# Patient Record
Sex: MALE | Race: WHITE | NOT HISPANIC OR LATINO | Employment: OTHER | URBAN - METROPOLITAN AREA
[De-identification: names, ages, dates, MRNs, and addresses within clinical notes are randomized per-mention and may not be internally consistent; named-entity substitution may affect disease eponyms.]

---

## 2019-01-28 ENCOUNTER — CONVERSION ENCOUNTER (OUTPATIENT)
Dept: SURGERY | Facility: CLINIC | Age: 79
End: 2019-01-28

## 2019-01-28 ENCOUNTER — PROCEDURE VISIT CONVERTED (OUTPATIENT)
Dept: UROLOGY | Facility: CLINIC | Age: 79
End: 2019-01-28
Attending: UROLOGY

## 2020-03-12 ENCOUNTER — HOSPITAL ENCOUNTER (OUTPATIENT)
Dept: SURGERY | Facility: CLINIC | Age: 80
Discharge: HOME | End: 2020-03-12
Attending: PHYSICIAN ASSISTANT

## 2020-03-14 LAB — BACTERIA UR CULT: NORMAL

## 2020-10-27 ENCOUNTER — HOSPITAL ENCOUNTER (OUTPATIENT)
Dept: SURGERY | Facility: CLINIC | Age: 80
Discharge: HOME | End: 2020-10-27
Attending: UROLOGY

## 2020-10-27 ENCOUNTER — OFFICE VISIT CONVERTED (OUTPATIENT)
Dept: UROLOGY | Facility: CLINIC | Age: 80
End: 2020-10-27
Attending: UROLOGY

## 2021-05-13 NOTE — PROGRESS NOTES
Progress Note      Patient Name: Ty Koroma   Patient ID: [id number]   Sex: Male   YOB: 1940    Primary Care Provider: Keiko Street NP   Referring Provider: Keiko Street NP    Visit Date: October 27, 2020    Provider: Nazario Pearce MD   Location: [facility]   Location Address: [address] [place] [address]  [id number]   Location Phone: [phone]          Chief Complaint  · pt here for urologic issues      History Of Present Illness     80-year-old  gentleman history of low-grade bladder dx 2007    ok stream.  No trouble with initiation of stream. Nocturia X  0.  No urgency or frequency.  No strning.  On finasteride and doxazosin for a few years.    PVR today 87    no gross hematuria     Previous     No history of kidney stone.    Mother with nephrolithiasis    No cardiopulmonary history.  Patient does not smoke.  Patient does not use blood thinner.    Bladder cancer history    Pt never had a recurrence.  Has  made the decision to be followed yearly with urine cytology and UA    3/20  cytologynegative   1/19 cystoscopysure prostatic urethra, minor trabeculations.  Negative  9/15 cytotologynegative  8/12  cystoscopy bilateral retrograde pyelogramsnegative  3/07  TURBTlow-grade urothelial carcinoma, grade 1, papillary    PSA History    9/15 1.45   9/14 1.12    6/10  started finasteride       Past Medical History  Bladder Cancer; BPH; BPH with Urinary Obstruction; Carcinoma In Situ Of Skin; High cholesterol; Personal Hx Bladder Cancer         Past Surgical History  Colonoscopy; Cyst Removal; Cystoscopy; Gallbladder; Hernia; Skin cancer removed.; TURBT         Medication List  doxazosin oral; finasteride 5 mg oral tablet; levothyroxine 50 mcg oral tablet; pravastatin 20 mg oral tablet         Allergy List  Bactrim       Allergies Reconciled  Family Medical History  *Unremarkable         Social History  Tobacco (Former)         Review of  "Systems  · Constitutional  o Denies  o : chills  · Gastrointestinal  o Denies  o : vomiting      Vitals  Date Time BP Position Site L\R Cuff Size HR RR TEMP (F) WT  HT  BMI kg/m2 BSA m2 O2 Sat FR L/min FiO2        10/27/2020 01:21 PM       16  214lbs 16oz 5'  9\" 31.75 2.18             Physical Examination  · Constitutional  o Appearance  o : Well-appearing, well-developed, in no acute distress  · Respiratory  o Respiratory Effort  o : Unlabored breathing  · Neurologic  o Mental Status Examination  o :   § Orientation  § : Grossly oriented to person, place and time, judgment and insight intact, normal mood and affect          Results  · In-Office Procedures  o Surgical procedure  § IOP - Bladder Scan/Residual Urine (30443)   § Specimen vol Ur: 87   o Lab procedure  § Automated Dipstick Urinalysis (Surg Spec) WITHOUT Micro HMH (28355)   § Color Ur: Yellow   § Clarity Ur: Clear   § Glucose Ur Ql Strip: Negative   § Bilirub Ur Ql Strip: Negative   § Ketones Ur Ql Strip: Negative   § Sp Gr Ur Qn: 1.010   § Hgb Ur Ql Strip: Negative   § pH Ur-LsCnc: 5.5   § Prot Ur Ql Strip: Negative   § Urobilinogen Ur Strip-mCnc: 0.2 E.U./dL   § Nitrite Ur Ql Strip: Negative   § WBC Est Ur Ql Strip: Negative       Assessment  · Malignant neoplasm of urinary bladder, unspecified site     188.9/C67.9  · Benign prostatic hyperplasia without lower urinary tract symptoms     600.00/N40.0      Plan  · Medications  o Medications have been Reconciled  o Transition of Care or Provider Policy  · Instructions  o Electronically Identified Patient Education Materials Provided Electronically       BPH    Continue doxazosin and finasteride 5 mg daily.    History of TCC    Patient needs yearly urine cytology and UA.    Cytology today               Electronically Signed by: Dodie Kaur-, -Author on November 9, 2020 02:36:41 PM  Electronically Co-signed by: Nazario Pearce MD -Reviewer on November 9, 2020 03:41:19 PM  "

## 2021-05-14 VITALS — BODY MASS INDEX: 31.84 KG/M2 | WEIGHT: 215 LBS | RESPIRATION RATE: 16 BRPM | HEIGHT: 69 IN

## 2021-05-16 VITALS — WEIGHT: 210 LBS | BODY MASS INDEX: 31.1 KG/M2 | RESPIRATION RATE: 16 BRPM | HEIGHT: 69 IN

## 2021-11-17 DIAGNOSIS — N40.0 BENIGN PROSTATIC HYPERPLASIA, UNSPECIFIED WHETHER LOWER URINARY TRACT SYMPTOMS PRESENT: Primary | ICD-10-CM

## 2021-11-17 RX ORDER — DOXAZOSIN MESYLATE 4 MG/1
TABLET ORAL
COMMUNITY
Start: 2021-09-03 | End: 2022-01-19 | Stop reason: SDUPTHER

## 2021-11-17 RX ORDER — FINASTERIDE 5 MG/1
5 TABLET, FILM COATED ORAL DAILY
Qty: 90 TABLET | Refills: 4 | Status: SHIPPED | OUTPATIENT
Start: 2021-11-17 | End: 2022-01-19 | Stop reason: SDUPTHER

## 2022-01-19 ENCOUNTER — OFFICE VISIT (OUTPATIENT)
Dept: UROLOGY | Facility: CLINIC | Age: 82
End: 2022-01-19

## 2022-01-19 VITALS — BODY MASS INDEX: 33.41 KG/M2 | HEIGHT: 69 IN | RESPIRATION RATE: 12 BRPM | WEIGHT: 225.6 LBS

## 2022-01-19 DIAGNOSIS — N40.0 BENIGN PROSTATIC HYPERPLASIA WITHOUT LOWER URINARY TRACT SYMPTOMS: Primary | ICD-10-CM

## 2022-01-19 DIAGNOSIS — N40.0 BENIGN PROSTATIC HYPERPLASIA, UNSPECIFIED WHETHER LOWER URINARY TRACT SYMPTOMS PRESENT: ICD-10-CM

## 2022-01-19 PROBLEM — E78.00 HIGH CHOLESTEROL: Status: ACTIVE | Noted: 2022-01-19

## 2022-01-19 PROBLEM — C67.9 BLADDER CANCER: Status: ACTIVE | Noted: 2022-01-19

## 2022-01-19 PROBLEM — D04.9 CARCINOMA IN SITU OF SKIN: Status: ACTIVE | Noted: 2022-01-19

## 2022-01-19 LAB
BILIRUB BLD-MCNC: NEGATIVE MG/DL
CLARITY, POC: CLEAR
COLOR UR: YELLOW
EXPIRATION DATE: ABNORMAL
GLUCOSE UR STRIP-MCNC: NEGATIVE MG/DL
KETONES UR QL: NEGATIVE
LEUKOCYTE EST, POC: NEGATIVE
Lab: ABNORMAL
NITRITE UR-MCNC: NEGATIVE MG/ML
PH UR: 5 [PH] (ref 5–8)
PROT UR STRIP-MCNC: NEGATIVE MG/DL
RBC # UR STRIP: ABNORMAL /UL
SP GR UR: 1.02 (ref 1–1.03)
URINE VOLUME: 1
UROBILINOGEN UR QL: NORMAL

## 2022-01-19 PROCEDURE — 99213 OFFICE O/P EST LOW 20 MIN: CPT | Performed by: NURSE PRACTITIONER

## 2022-01-19 PROCEDURE — 51798 US URINE CAPACITY MEASURE: CPT | Performed by: NURSE PRACTITIONER

## 2022-01-19 PROCEDURE — 81003 URINALYSIS AUTO W/O SCOPE: CPT | Performed by: NURSE PRACTITIONER

## 2022-01-19 RX ORDER — CHOLESTYRAMINE 4 G/9G
POWDER, FOR SUSPENSION ORAL
COMMUNITY
Start: 2021-12-03 | End: 2023-01-23

## 2022-01-19 RX ORDER — DOXAZOSIN MESYLATE 4 MG/1
4 TABLET ORAL NIGHTLY
Qty: 90 TABLET | Refills: 3 | Status: SHIPPED | OUTPATIENT
Start: 2022-01-19 | End: 2023-01-23 | Stop reason: SDUPTHER

## 2022-01-19 RX ORDER — FINASTERIDE 5 MG/1
5 TABLET, FILM COATED ORAL DAILY
Qty: 90 TABLET | Refills: 4 | Status: SHIPPED | OUTPATIENT
Start: 2022-01-19 | End: 2022-04-19

## 2022-01-19 RX ORDER — ATORVASTATIN CALCIUM 20 MG/1
TABLET, FILM COATED ORAL
COMMUNITY
Start: 2021-12-04

## 2022-01-19 RX ORDER — PAROXETINE HYDROCHLORIDE 20 MG/1
TABLET, FILM COATED ORAL
COMMUNITY
Start: 2021-11-04

## 2022-01-19 RX ORDER — DONEPEZIL HYDROCHLORIDE 10 MG/1
TABLET, FILM COATED ORAL
COMMUNITY
Start: 2021-12-04

## 2022-01-19 RX ORDER — LEVOTHYROXINE SODIUM 0.07 MG/1
TABLET ORAL
COMMUNITY
Start: 2021-12-15

## 2022-01-19 RX ORDER — TRAZODONE HYDROCHLORIDE 50 MG/1
TABLET ORAL
COMMUNITY
Start: 2021-11-16

## 2022-01-19 RX ORDER — PRAVASTATIN SODIUM 20 MG
TABLET ORAL
COMMUNITY
End: 2023-01-23

## 2022-01-19 NOTE — PROGRESS NOTES
Chief Complaint: Benign Prostatic Hypertrophy (pt here for follow up)    Subjective         History of Present Illness  Ty Koroma is a 81 y.o. male presents to [facility] to be seen for Annual f/u BPH.    history of low-grade bladder dx 2007    ok stream.     No trouble with initiation of stream.     Nocturia X  0.      No urgency or frequency.      No straining.      On finasteride and doxazosin for a few years doing well on these no side effects .    no gross hematuria         Objective     Past Medical History:   Diagnosis Date   • Bladder cancer (HCC)    • BPH (benign prostatic hyperplasia)    • BPH with urinary obstruction 09/23/2015   • Carcinoma in situ of skin    • High cholesterol    • Hx of bladder cancer 10/05/2016    Personal Hx of Bladder Cancer       Past Surgical History:   Procedure Laterality Date   • COLONOSCOPY     • CYST REMOVAL      Groin   • CYSTOSCOPY     • GALLBLADDER SURGERY     • HERNIA REPAIR     • SKIN CANCER EXCISION      Removed from nose   • TRANSURETHRAL RESECTION OF BLADDER TUMOR           Current Outpatient Medications:   •  atorvastatin (LIPITOR) 20 MG tablet, , Disp: , Rfl:   •  cholestyramine (QUESTRAN) 4 g packet, , Disp: , Rfl:   •  donepezil (ARICEPT) 10 MG tablet, , Disp: , Rfl:   •  doxazosin (CARDURA) 4 MG tablet, Take 1 tablet by mouth Every Night., Disp: 90 tablet, Rfl: 3  •  finasteride (PROSCAR) 5 MG tablet, Take 1 tablet by mouth Daily for 90 days., Disp: 90 tablet, Rfl: 4  •  levothyroxine (SYNTHROID, LEVOTHROID) 75 MCG tablet, , Disp: , Rfl:   •  PARoxetine (PAXIL) 20 MG tablet, , Disp: , Rfl:   •  pravastatin (PRAVACHOL) 20 MG tablet, pravastatin 20 mg oral tablet take 1 tablet (20 mg) by oral route once daily   Active, Disp: , Rfl:   •  traZODone (DESYREL) 50 MG tablet, , Disp: , Rfl:     Allergies   Allergen Reactions   • Sulfamethoxazole-Trimethoprim Rash        History reviewed. No pertinent family history.    Social History  "    Socioeconomic History   • Marital status:    Tobacco Use   • Smoking status: Former Smoker   • Smokeless tobacco: Former User   • Tobacco comment: Quit 25 years ago   Vaping Use   • Vaping Use: Never used   Substance and Sexual Activity   • Alcohol use: Defer   • Sexual activity: Defer       Vital Signs:   Resp 12   Ht 175.3 cm (69\")   Wt 102 kg (225 lb 9.6 oz)   BMI 33.32 kg/m²      Physical Exam     Result Review :   The following data was reviewed by: DELILAH Watts on 01/19/2022:  Results for orders placed or performed in visit on 01/19/22   Bladder Scan   Result Value Ref Range    Urine Volume 1    POC Urinalysis Dipstick, Automated    Specimen: Urine   Result Value Ref Range    Color Yellow Yellow, Straw, Dark Yellow, Ayesha    Clarity, UA Clear Clear    Specific Gravity  1.025 1.005 - 1.030    pH, Urine 5.0 5.0 - 8.0    Leukocytes Negative Negative    Nitrite, UA Negative Negative    Protein, POC Negative Negative mg/dL    Glucose, UA Negative Negative, 1000 mg/dL (3+) mg/dL    Ketones, UA Negative Negative    Urobilinogen, UA Normal Normal    Bilirubin Negative Negative    Blood, UA Trace (A) Negative    Lot Number [id number]     Expiration Date 2,022/10         Bladder Scan interpretation 01/19/2022    Estimation of residual urine via BVI 3000 Verathon Bladder Scan  Residual Urine: 1 ml  Indication: Benign prostatic hyperplasia without lower urinary tract symptoms    Benign prostatic hyperplasia, unspecified whether lower urinary tract symptoms present   Position: Supine  Examination: Incremental scanning of the suprapubic area using 2.0 MHz transducer using copious amounts of acoustic gel.   Findings: An anechoic area was demonstrated which represented the bladder, with measurement of residual urine as noted. I inspected this myself. In that the residual urine was insignificant, refer to plan for treatment and plan necessary at this time.            Procedures        Assessment and Plan  "   Diagnoses and all orders for this visit:    1. Benign prostatic hyperplasia without lower urinary tract symptoms (Primary)  -     POC Urinalysis Dipstick, Automated  -     Bladder Scan  -     doxazosin (CARDURA) 4 MG tablet; Take 1 tablet by mouth Every Night.  Dispense: 90 tablet; Refill: 3    2. Benign prostatic hyperplasia, unspecified whether lower urinary tract symptoms present  -     finasteride (PROSCAR) 5 MG tablet; Take 1 tablet by mouth Daily for 90 days.  Dispense: 90 tablet; Refill: 4      Patient doing well at this point in time.  Prescription sent for 1 year.  Patient will call the office if he is with any gross hematuria new or worsening symptoms.      I spent 10 minutes caring for Ty on this date of service. This time includes time spent by me in the following activities:reviewing tests, obtaining and/or reviewing a separately obtained history, performing a medically appropriate examination and/or evaluation , counseling and educating the patient/family/caregiver, ordering medications, tests, or procedures, and documenting information in the medical record  Follow Up   Return in about 1 year (around 1/19/2023) for f/u BP, annual for bladder cancer .  Patient was given instructions and counseling regarding his condition or for health maintenance advice. Please see specific information pulled into the AVS if appropriate.         This document has been electronically signed by DELILAH Watts  January 19, 2022 10:53 EST

## 2023-01-23 ENCOUNTER — OFFICE VISIT (OUTPATIENT)
Dept: UROLOGY | Facility: CLINIC | Age: 83
End: 2023-01-23
Payer: MEDICARE

## 2023-01-23 VITALS — WEIGHT: 231.8 LBS | HEIGHT: 69 IN | RESPIRATION RATE: 14 BRPM | BODY MASS INDEX: 34.33 KG/M2

## 2023-01-23 DIAGNOSIS — N40.1 BPH WITH URINARY OBSTRUCTION: ICD-10-CM

## 2023-01-23 DIAGNOSIS — N13.8 BPH WITH URINARY OBSTRUCTION: ICD-10-CM

## 2023-01-23 DIAGNOSIS — C67.9 MALIGNANT NEOPLASM OF URINARY BLADDER, UNSPECIFIED SITE: Primary | ICD-10-CM

## 2023-01-23 DIAGNOSIS — N40.0 BENIGN PROSTATIC HYPERPLASIA WITHOUT LOWER URINARY TRACT SYMPTOMS: ICD-10-CM

## 2023-01-23 LAB
BILIRUB BLD-MCNC: NEGATIVE MG/DL
CLARITY, POC: CLEAR
COLOR UR: YELLOW
EXPIRATION DATE: ABNORMAL
GLUCOSE UR STRIP-MCNC: NEGATIVE MG/DL
KETONES UR QL: NEGATIVE
LEUKOCYTE EST, POC: NEGATIVE
Lab: ABNORMAL
NITRITE UR-MCNC: NEGATIVE MG/ML
PH UR: 5.5 [PH] (ref 5–8)
PROT UR STRIP-MCNC: NEGATIVE MG/DL
RBC # UR STRIP: ABNORMAL /UL
SP GR UR: 1.01 (ref 1–1.03)
UROBILINOGEN UR QL: NORMAL

## 2023-01-23 PROCEDURE — 99214 OFFICE O/P EST MOD 30 MIN: CPT | Performed by: NURSE PRACTITIONER

## 2023-01-23 PROCEDURE — 81003 URINALYSIS AUTO W/O SCOPE: CPT | Performed by: NURSE PRACTITIONER

## 2023-01-23 PROCEDURE — 81015 MICROSCOPIC EXAM OF URINE: CPT | Performed by: NURSE PRACTITIONER

## 2023-01-23 RX ORDER — FINASTERIDE 5 MG/1
5 TABLET, FILM COATED ORAL DAILY
COMMUNITY
End: 2023-01-23 | Stop reason: SDUPTHER

## 2023-01-23 RX ORDER — OMEPRAZOLE 40 MG/1
40 CAPSULE, DELAYED RELEASE ORAL DAILY
COMMUNITY

## 2023-01-23 RX ORDER — FINASTERIDE 5 MG/1
5 TABLET, FILM COATED ORAL DAILY
Qty: 90 TABLET | Refills: 4 | Status: SHIPPED | OUTPATIENT
Start: 2023-01-23

## 2023-01-23 RX ORDER — LOPERAMIDE HYDROCHLORIDE 2 MG/1
2 CAPSULE ORAL 4 TIMES DAILY PRN
COMMUNITY

## 2023-01-23 RX ORDER — KETOCONAZOLE 20 MG/ML
SHAMPOO TOPICAL
COMMUNITY
Start: 2022-10-26

## 2023-01-23 RX ORDER — LISINOPRIL 5 MG/1
TABLET ORAL
COMMUNITY
Start: 2023-01-05

## 2023-01-23 RX ORDER — DOXAZOSIN MESYLATE 4 MG/1
4 TABLET ORAL NIGHTLY
Qty: 90 TABLET | Refills: 4 | Status: SHIPPED | OUTPATIENT
Start: 2023-01-23 | End: 2023-03-30 | Stop reason: SDUPTHER

## 2023-01-23 RX ORDER — MONTELUKAST SODIUM 4 MG/1
TABLET, CHEWABLE ORAL
COMMUNITY
Start: 2022-12-28

## 2023-01-23 NOTE — PROGRESS NOTES
Chief Complaint: Benign Prostatic Hypertrophy (Pt here for follow up.  Pt says medication is working.)    Subjective         History of Present Illness  Ty Koroma is a 82 y.o. male presents to [facility] to be seen for Annual f/u BPH/ bladder cancer.    history of low-grade bladder dx 2007    ok stream.     States some trouble with initiation of stream.     Nocturia X  1.      No urgency or frequency.      No straining.      On finasteride and doxazosin for a few years doing well on these no side effects.    No significant bother with urinary symptoms.    no gross hematuria         Objective     Past Medical History:   Diagnosis Date   • Bladder cancer (HCC)    • BPH (benign prostatic hyperplasia)    • BPH with urinary obstruction 09/23/2015   • Carcinoma in situ of skin    • High cholesterol    • Hx of bladder cancer 10/05/2016    Personal Hx of Bladder Cancer       Past Surgical History:   Procedure Laterality Date   • COLONOSCOPY     • CYST REMOVAL      Groin   • CYSTOSCOPY     • GALLBLADDER SURGERY     • HERNIA REPAIR     • SKIN CANCER EXCISION      Removed from nose   • TRANSURETHRAL RESECTION OF BLADDER TUMOR           Current Outpatient Medications:   •  atorvastatin (LIPITOR) 20 MG tablet, , Disp: , Rfl:   •  colestipol (COLESTID) 1 g tablet, , Disp: , Rfl:   •  donepezil (ARICEPT) 10 MG tablet, , Disp: , Rfl:   •  doxazosin (CARDURA) 4 MG tablet, Take 1 tablet by mouth Every Night., Disp: 90 tablet, Rfl: 4  •  finasteride (PROSCAR) 5 MG tablet, Take 1 tablet by mouth Daily., Disp: 90 tablet, Rfl: 4  •  ketoconazole (NIZORAL) 2 % shampoo, , Disp: , Rfl:   •  levothyroxine (SYNTHROID, LEVOTHROID) 75 MCG tablet, , Disp: , Rfl:   •  lisinopril (PRINIVIL,ZESTRIL) 5 MG tablet, , Disp: , Rfl:   •  loperamide (IMODIUM) 2 MG capsule, Take 2 mg by mouth 4 (Four) Times a Day As Needed for Diarrhea., Disp: , Rfl:   •  omeprazole (priLOSEC) 40 MG capsule, Take 40 mg by mouth Daily., Disp:  ", Rfl:   •  PARoxetine (PAXIL) 20 MG tablet, , Disp: , Rfl:   •  traZODone (DESYREL) 50 MG tablet, , Disp: , Rfl:     Allergies   Allergen Reactions   • Sulfamethoxazole-Trimethoprim Rash        History reviewed. No pertinent family history.    Social History     Socioeconomic History   • Marital status:    Tobacco Use   • Smoking status: Former   • Smokeless tobacco: Former   • Tobacco comments:     Quit 25 years ago   Vaping Use   • Vaping Use: Never used   Substance and Sexual Activity   • Alcohol use: Defer   • Drug use: Defer   • Sexual activity: Defer       Vital Signs:   Resp 14   Ht 175.3 cm (69\")   Wt 105 kg (231 lb 12.8 oz)   BMI 34.23 kg/m²      Physical Exam     Result Review :   The following data was reviewed by: DELILAH Watts on 01/19/2022:  Results for orders placed or performed in visit on 01/23/23   POC Urinalysis Dipstick, Automated    Specimen: Urine   Result Value Ref Range    Color Yellow Yellow, Straw, Dark Yellow, Ayesha    Clarity, UA Clear Clear    Specific Gravity  1.015 1.005 - 1.030    pH, Urine 5.5 5.0 - 8.0    Leukocytes Negative Negative    Nitrite, UA Negative Negative    Protein, POC Negative Negative mg/dL    Glucose, UA Negative Negative mg/dL    Ketones, UA Negative Negative    Urobilinogen, UA Normal Normal, 0.2 E.U./dL    Bilirubin Negative Negative    Blood, UA Trace (A) Negative    Lot Number [id number]     Expiration Date 2,023/7                  Procedures        Assessment and Plan    Diagnoses and all orders for this visit:    1. Malignant neoplasm of urinary bladder, unspecified site (HCC) (Primary)  -     POC Urinalysis Dipstick, Automated  -     Urinalysis, Microscopic Only - Urine, Clean Catch; Future    2. BPH with urinary obstruction  -     POC Urinalysis Dipstick, Automated  -     finasteride (PROSCAR) 5 MG tablet; Take 1 tablet by mouth Daily.  Dispense: 90 tablet; Refill: 4    3. Benign prostatic hyperplasia without lower urinary tract symptoms  -  "    doxazosin (CARDURA) 4 MG tablet; Take 1 tablet by mouth Every Night.  Dispense: 90 tablet; Refill: 4      Patient doing well at this point in time.     Prescription refills sent for 1 year.    Patient will call the office if he is with any gross hematuria new or worsening symptoms.      I spent 10 minutes caring for Ty on this date of service. This time includes time spent by me in the following activities:reviewing tests, obtaining and/or reviewing a separately obtained history, performing a medically appropriate examination and/or evaluation , counseling and educating the patient/family/caregiver, ordering medications, tests, or procedures, and documenting information in the medical record  Follow Up   Return in about 1 year (around 1/23/2024) for annual f/u bph/ bladder cancer .  Patient was given instructions and counseling regarding his condition or for health maintenance advice. Please see specific information pulled into the AVS if appropriate.         This document has been electronically signed by DELILAH Watts  January 23, 2023 11:19 EST

## 2023-01-24 LAB
BACTERIA UR QL AUTO: NORMAL /HPF
HYALINE CASTS UR QL AUTO: NORMAL /LPF
RBC # UR STRIP: NORMAL /HPF
REF LAB TEST METHOD: NORMAL
SQUAMOUS #/AREA URNS HPF: NORMAL /HPF
WBC # UR STRIP: NORMAL /HPF

## 2023-03-30 DIAGNOSIS — N40.0 BENIGN PROSTATIC HYPERPLASIA WITHOUT LOWER URINARY TRACT SYMPTOMS: ICD-10-CM

## 2023-03-30 RX ORDER — DOXAZOSIN MESYLATE 4 MG/1
4 TABLET ORAL NIGHTLY
Qty: 90 TABLET | Refills: 4 | Status: SHIPPED | OUTPATIENT
Start: 2023-03-30

## 2023-04-14 DIAGNOSIS — N13.8 BPH WITH URINARY OBSTRUCTION: ICD-10-CM

## 2023-04-14 DIAGNOSIS — N40.1 BPH WITH URINARY OBSTRUCTION: ICD-10-CM

## 2023-04-14 RX ORDER — FINASTERIDE 5 MG/1
5 TABLET, FILM COATED ORAL DAILY
Qty: 90 TABLET | Refills: 4 | Status: SHIPPED | OUTPATIENT
Start: 2023-04-14

## 2024-02-22 ENCOUNTER — TELEPHONE (OUTPATIENT)
Dept: UROLOGY | Facility: CLINIC | Age: 84
End: 2024-02-22
Payer: MEDICARE

## 2024-04-17 ENCOUNTER — OFFICE VISIT (OUTPATIENT)
Dept: UROLOGY | Facility: CLINIC | Age: 84
End: 2024-04-17
Payer: MEDICARE

## 2024-04-17 VITALS
BODY MASS INDEX: 33.15 KG/M2 | HEIGHT: 69 IN | DIASTOLIC BLOOD PRESSURE: 80 MMHG | RESPIRATION RATE: 14 BRPM | SYSTOLIC BLOOD PRESSURE: 156 MMHG | HEART RATE: 66 BPM | WEIGHT: 223.8 LBS

## 2024-04-17 DIAGNOSIS — C67.9 MALIGNANT NEOPLASM OF URINARY BLADDER, UNSPECIFIED SITE: Primary | ICD-10-CM

## 2024-04-17 DIAGNOSIS — N40.1 BPH WITH URINARY OBSTRUCTION: ICD-10-CM

## 2024-04-17 DIAGNOSIS — Z85.51 PERSONAL HISTORY OF MALIGNANT NEOPLASM OF BLADDER: ICD-10-CM

## 2024-04-17 DIAGNOSIS — N13.8 BPH WITH URINARY OBSTRUCTION: ICD-10-CM

## 2024-04-17 DIAGNOSIS — N40.0 BENIGN PROSTATIC HYPERPLASIA WITHOUT LOWER URINARY TRACT SYMPTOMS: ICD-10-CM

## 2024-04-17 PROBLEM — R41.3 AMNESIA: Status: ACTIVE | Noted: 2024-04-17

## 2024-04-17 PROBLEM — E53.8 COBALAMIN DEFICIENCY: Status: ACTIVE | Noted: 2024-04-17

## 2024-04-17 PROBLEM — E03.9 HYPOTHYROIDISM: Status: ACTIVE | Noted: 2024-04-17

## 2024-04-17 PROBLEM — E55.9 VITAMIN D DEFICIENCY: Status: ACTIVE | Noted: 2024-04-17

## 2024-04-17 PROBLEM — I10 BENIGN ESSENTIAL HYPERTENSION: Status: ACTIVE | Noted: 2024-04-17

## 2024-04-17 PROBLEM — K21.9 GASTROESOPHAGEAL REFLUX DISEASE: Status: ACTIVE | Noted: 2024-04-17

## 2024-04-17 PROBLEM — E78.5 DYSLIPIDEMIA: Status: ACTIVE | Noted: 2024-04-17

## 2024-04-17 PROBLEM — R53.81 MALAISE AND FATIGUE: Status: ACTIVE | Noted: 2024-04-17

## 2024-04-17 PROBLEM — F41.8 MIXED ANXIETY AND DEPRESSIVE DISORDER: Status: ACTIVE | Noted: 2024-04-17

## 2024-04-17 PROBLEM — R73.9 HYPERGLYCEMIA: Status: ACTIVE | Noted: 2024-04-17

## 2024-04-17 PROBLEM — R53.83 MALAISE AND FATIGUE: Status: ACTIVE | Noted: 2024-04-17

## 2024-04-17 PROBLEM — E53.9 VITAMIN B DEFICIENCY: Status: ACTIVE | Noted: 2024-04-17

## 2024-04-17 PROBLEM — D72.819 LEUKOPENIA: Status: ACTIVE | Noted: 2024-04-17

## 2024-04-17 PROBLEM — E66.9 GENERALIZED OBESITY: Status: ACTIVE | Noted: 2024-04-17

## 2024-04-17 LAB
BILIRUB BLD-MCNC: NEGATIVE MG/DL
CLARITY, POC: CLEAR
COLOR UR: YELLOW
EXPIRATION DATE: NORMAL
GLUCOSE UR STRIP-MCNC: NEGATIVE MG/DL
KETONES UR QL: NEGATIVE
LEUKOCYTE EST, POC: NEGATIVE
Lab: NORMAL
NITRITE UR-MCNC: NEGATIVE MG/ML
PH UR: 6 [PH] (ref 5–8)
PROT UR STRIP-MCNC: NEGATIVE MG/DL
RBC # UR STRIP: NEGATIVE /UL
SP GR UR: 1.02 (ref 1–1.03)
URINE VOLUME: 21
UROBILINOGEN UR QL: NORMAL

## 2024-04-17 PROCEDURE — 88108 CYTOPATH CONCENTRATE TECH: CPT | Performed by: NURSE PRACTITIONER

## 2024-04-17 RX ORDER — LISINOPRIL AND HYDROCHLOROTHIAZIDE 12.5; 1 MG/1; MG/1
1 TABLET ORAL DAILY
COMMUNITY

## 2024-04-17 RX ORDER — DOXAZOSIN MESYLATE 4 MG/1
4 TABLET ORAL NIGHTLY
Qty: 90 TABLET | Refills: 4 | Status: SHIPPED | OUTPATIENT
Start: 2024-04-17

## 2024-04-17 RX ORDER — FINASTERIDE 5 MG/1
5 TABLET, FILM COATED ORAL DAILY
Qty: 90 TABLET | Refills: 4 | Status: SHIPPED | OUTPATIENT
Start: 2024-04-17

## 2024-04-17 NOTE — PROGRESS NOTES
Chief Complaint: Follow-up (Pt here for follow up.  Pt is taking finasteride and doxazosin.  Pt states he is not having any issues at this time.)    Subjective         Benign Prostatic Hypertrophy      Ty Koroma is a 83 y.o. male presents to [facility] to be seen for Annual f/u BPH/ bladder cancer.    history of low-grade bladder dx 2007    ok stream.     Denies trouble with initiation of stream.     Nocturia X  1.      No urgency or frequency.      No straining.      On finasteride and doxazosin for a few years doing well on these no side effects.    No significant bother with urinary symptoms.    no gross hematuria         Objective     Past Medical History:   Diagnosis Date    Bladder cancer     BPH (benign prostatic hyperplasia)     BPH with urinary obstruction 09/23/2015    Carcinoma in situ of skin     High cholesterol     Hx of bladder cancer 10/05/2016    Personal Hx of Bladder Cancer       Past Surgical History:   Procedure Laterality Date    COLONOSCOPY      CYST REMOVAL      Groin    CYSTOSCOPY      GALLBLADDER SURGERY      HERNIA REPAIR      SKIN CANCER EXCISION      Removed from nose    TRANSURETHRAL RESECTION OF BLADDER TUMOR           Current Outpatient Medications:     atorvastatin (LIPITOR) 20 MG tablet, , Disp: , Rfl:     colestipol (COLESTID) 1 g tablet, , Disp: , Rfl:     donepezil (ARICEPT) 10 MG tablet, , Disp: , Rfl:     doxazosin (CARDURA) 4 MG tablet, Take 1 tablet by mouth Every Night., Disp: 90 tablet, Rfl: 4    finasteride (PROSCAR) 5 MG tablet, Take 1 tablet by mouth Daily., Disp: 90 tablet, Rfl: 4    ketoconazole (NIZORAL) 2 % shampoo, , Disp: , Rfl:     levothyroxine (SYNTHROID, LEVOTHROID) 75 MCG tablet, , Disp: , Rfl:     lisinopril (PRINIVIL,ZESTRIL) 5 MG tablet, , Disp: , Rfl:     lisinopril-hydrochlorothiazide (PRINZIDE,ZESTORETIC) 10-12.5 MG per tablet, Take 1 tablet by mouth Daily., Disp: , Rfl:     loperamide (IMODIUM) 2 MG capsule, Take 1  "capsule by mouth 4 (Four) Times a Day As Needed for Diarrhea., Disp: , Rfl:     omeprazole (priLOSEC) 40 MG capsule, Take 1 capsule by mouth Daily., Disp: , Rfl:     PARoxetine (PAXIL) 20 MG tablet, , Disp: , Rfl:     traZODone (DESYREL) 50 MG tablet, , Disp: , Rfl:     Allergies   Allergen Reactions    Sulfa Antibiotics Other (See Comments)    Sulfamethoxazole-Trimethoprim Rash and Unknown - Low Severity        History reviewed. No pertinent family history.    Social History     Socioeconomic History    Marital status:    Tobacco Use    Smoking status: Former     Passive exposure: Past    Smokeless tobacco: Former    Tobacco comments:     Quit 25 years ago   Vaping Use    Vaping status: Never Used   Substance and Sexual Activity    Alcohol use: Defer    Drug use: Defer    Sexual activity: Defer       Vital Signs:   /80 (BP Location: Left arm, Patient Position: Sitting)   Pulse 66   Resp 14   Ht 175.3 cm (69\")   Wt 102 kg (223 lb 12.8 oz)   BMI 33.05 kg/m²      Physical Exam     Result Review :   The following data was reviewed by: DELILAH Watts on 04/17/2024:  Results for orders placed or performed in visit on 04/17/24   Bladder Scan   Result Value Ref Range    Urine Volume 21    POC Urinalysis Dipstick, Automated    Specimen: Urine   Result Value Ref Range    Color Yellow Yellow, Straw, Dark Yellow, Ayesha    Clarity, UA Clear Clear    Specific Gravity  1.025 1.005 - 1.030    pH, Urine 6.0 5.0 - 8.0    Leukocytes Negative Negative    Nitrite, UA Negative Negative    Protein, POC Negative Negative mg/dL    Glucose, UA Negative Negative mg/dL    Ketones, UA Negative Negative    Urobilinogen, UA Normal Normal, 0.2 E.U./dL    Bilirubin Negative Negative    Blood, UA Negative Negative    Lot Number [id number]     Expiration Date 2,025/2           Bladder Scan interpretation 04/17/2024    Estimation of residual urine via SafeMedia[employer] 3000 Verathon Bladder Scan  MA/nurse performing: rodger schmidt Ma   Residual " Urine: 21 ml  Indication: Malignant neoplasm of urinary bladder, unspecified site    Personal history of malignant neoplasm of bladder    Benign prostatic hyperplasia without lower urinary tract symptoms    BPH with urinary obstruction   Position: Supine  Examination: Incremental scanning of the suprapubic area using 2.0 MHz transducer using copious amounts of acoustic gel.   Findings: An anechoic area was demonstrated which represented the bladder, with measurement of residual urine as noted. I inspected this myself. In that the residual urine was stable or insignificant, refer to plan for treatment and plan necessary at this time.             Procedures        Assessment and Plan    Diagnoses and all orders for this visit:    1. Malignant neoplasm of urinary bladder, unspecified site (Primary)  -     POC Urinalysis Dipstick, Automated  -     Bladder Scan    2. Personal history of malignant neoplasm of bladder  -     POC Urinalysis Dipstick, Automated  -     Bladder Scan  -     Cytology, Urine; Future    3. Benign prostatic hyperplasia without lower urinary tract symptoms  -     doxazosin (CARDURA) 4 MG tablet; Take 1 tablet by mouth Every Night.  Dispense: 90 tablet; Refill: 4    4. BPH with urinary obstruction  -     finasteride (PROSCAR) 5 MG tablet; Take 1 tablet by mouth Daily.  Dispense: 90 tablet; Refill: 4      Patient doing well at this point in time.     Prescription refills sent for 1 year.    Patient will call the office if he is with any gross hematuria new or worsening symptoms.      I spent 10 minutes caring for Ty on this date of service. This time includes time spent by me in the following activities:reviewing tests, obtaining and/or reviewing a separately obtained history, performing a medically appropriate examination and/or evaluation , counseling and educating the patient/family/caregiver, ordering medications, tests, or procedures, and documenting information in the medical record  Follow  Up   Return in about 1 year (around 4/17/2025) for annual f/u with PVR and UA .  Patient was given instructions and counseling regarding his condition or for health maintenance advice. Please see specific information pulled into the AVS if appropriate.         This document has been electronically signed by DELILAH Watts  April 17, 2024 14:26 EDT

## 2024-04-19 ENCOUNTER — TELEPHONE (OUTPATIENT)
Dept: UROLOGY | Facility: CLINIC | Age: 84
End: 2024-04-19
Payer: MEDICARE

## 2024-04-19 LAB
CYTO UR: NORMAL
LAB AP CASE REPORT: NORMAL
LAB AP CLINICAL INFORMATION: NORMAL
LAB AP NON-GYN INTERPRETATION: NORMAL
PATH REPORT.FINAL DX SPEC: NORMAL
PATH REPORT.GROSS SPEC: NORMAL

## 2024-04-19 NOTE — TELEPHONE ENCOUNTER
LMOM for pt to return our call.  I need to go over results with pt.  OKAY FOR HUB TO RELAY MESSAGE.

## 2024-04-19 NOTE — TELEPHONE ENCOUNTER
----- Message from DELILAH Watts sent at 4/19/2024 11:26 AM EDT -----  Please inform patient his urine shows no abnormal cells concerning for bladder cancer recurrence  ----- Message -----  From: Tyra Gallego  Sent: 4/17/2024   2:18 PM EDT  To: DELILAH Watts

## 2024-04-22 ENCOUNTER — TELEPHONE (OUTPATIENT)
Dept: UROLOGY | Facility: CLINIC | Age: 84
End: 2024-04-22
Payer: MEDICARE

## 2025-01-08 ENCOUNTER — TELEPHONE (OUTPATIENT)
Dept: UROLOGY | Age: 85
End: 2025-01-08
Payer: MEDICARE

## 2025-01-08 DIAGNOSIS — N40.1 BPH WITH URINARY OBSTRUCTION: ICD-10-CM

## 2025-01-08 DIAGNOSIS — N13.8 BPH WITH URINARY OBSTRUCTION: ICD-10-CM

## 2025-01-08 RX ORDER — FINASTERIDE 5 MG/1
5 TABLET, FILM COATED ORAL DAILY
Qty: 90 TABLET | Refills: 2 | Status: SHIPPED | OUTPATIENT
Start: 2025-01-08

## 2025-01-08 NOTE — TELEPHONE ENCOUNTER
Tried to call julia back and I got no answer. If she calls back I had the prescription sent to the [place] pharmacy.

## 2025-01-08 NOTE — TELEPHONE ENCOUNTER
TRACIE CALLED FROM [facility].  SHE ASKED TO SPEAK TO SOMEONE REGARDING PATIENT'S PRESCRIPTION FOR THE PROSCAR/FINASTERIDE 5 MG PRESCRIPTION.      SHE SAID THE PATIENT HAS SWITCHED PHARMACIES AND THEY DO A PILL PLANNER FOR HIM.  HE WOULD NEED A NEW PRESCRIPTION SENT TO [facility] AT [address] [place] [address], [place], [place].  THEIR PHONE NUMBER [phone].    I ADDED THE PHARMACY TO HIS CHART.    #[phone]

## 2025-06-25 ENCOUNTER — OFFICE VISIT (OUTPATIENT)
Dept: UROLOGY | Facility: CLINIC | Age: 85
End: 2025-06-25
Payer: MEDICARE

## 2025-06-25 VITALS — BODY MASS INDEX: 33.31 KG/M2 | HEIGHT: 69 IN | WEIGHT: 224.87 LBS | RESPIRATION RATE: 16 BRPM

## 2025-06-25 DIAGNOSIS — N40.1 BPH WITH URINARY OBSTRUCTION: ICD-10-CM

## 2025-06-25 DIAGNOSIS — N40.0 BENIGN PROSTATIC HYPERPLASIA WITHOUT LOWER URINARY TRACT SYMPTOMS: ICD-10-CM

## 2025-06-25 DIAGNOSIS — Z85.51 PERSONAL HISTORY OF MALIGNANT NEOPLASM OF BLADDER: Primary | ICD-10-CM

## 2025-06-25 DIAGNOSIS — N13.8 BPH WITH URINARY OBSTRUCTION: ICD-10-CM

## 2025-06-25 LAB
BILIRUB BLD-MCNC: NEGATIVE MG/DL
CLARITY, POC: CLEAR
COLOR UR: YELLOW
EXPIRATION DATE: ABNORMAL
GLUCOSE UR STRIP-MCNC: NEGATIVE MG/DL
KETONES UR QL: NEGATIVE
LEUKOCYTE EST, POC: NEGATIVE
Lab: ABNORMAL
NITRITE UR-MCNC: NEGATIVE MG/ML
PH UR: 5.5 [PH] (ref 5–8)
PROT UR STRIP-MCNC: NEGATIVE MG/DL
RBC # UR STRIP: ABNORMAL /UL
SP GR UR: 1.02 (ref 1–1.03)
URINE VOLUME: 87
UROBILINOGEN UR QL: ABNORMAL

## 2025-06-25 RX ORDER — TRAMADOL HYDROCHLORIDE 50 MG/1
50 TABLET ORAL
COMMUNITY

## 2025-06-25 RX ORDER — FINASTERIDE 5 MG/1
5 TABLET, FILM COATED ORAL DAILY
Qty: 90 TABLET | Refills: 4 | Status: SHIPPED | OUTPATIENT
Start: 2025-06-25

## 2025-06-25 RX ORDER — DOXAZOSIN 4 MG/1
4 TABLET ORAL NIGHTLY
Qty: 90 TABLET | Refills: 4 | Status: SHIPPED | OUTPATIENT
Start: 2025-06-25

## 2025-06-25 NOTE — PROGRESS NOTES
Chief Complaint: Benign Prostatic Hypertrophy (1 year f/u)    Subjective         Benign Prostatic Hypertrophy      Ty Koroma is a 84 y.o. male presents to [facility] to be seen for Annual f/u BPH/ bladder cancer.    He has remained on  finasteride and doxazosin for a few years doing well on these no side effects.    He is with a good stream.     No straining or hesitancy.     Very minimal leakage.     Some urgency at times.    Nocturia x 1.    No gh or uti in the last year.     PVR 87      Previous:  history of low-grade bladder dx 2007    ok stream.     Denies trouble with initiation of stream.     Nocturia X  1.      No urgency or frequency.      No straining.      On finasteride and doxazosin for a few years doing well on these no side effects.    No significant bother with urinary symptoms.    no gross hematuria         Objective     Past Medical History:   Diagnosis Date    Bladder cancer     BPH (benign prostatic hyperplasia)     BPH with urinary obstruction 09/23/2015    Carcinoma in situ of skin     High cholesterol     Hx of bladder cancer 10/05/2016    Personal Hx of Bladder Cancer       Past Surgical History:   Procedure Laterality Date    COLONOSCOPY      CYST REMOVAL      Groin    CYSTOSCOPY      GALLBLADDER SURGERY      HERNIA REPAIR      SKIN CANCER EXCISION      Removed from nose    TRANSURETHRAL RESECTION OF BLADDER TUMOR           Current Outpatient Medications:     doxazosin (CARDURA) 4 MG tablet, Take 1 tablet by mouth Every Night., Disp: 90 tablet, Rfl: 4    finasteride (PROSCAR) 5 MG tablet, Take 1 tablet by mouth Daily., Disp: 90 tablet, Rfl: 4    atorvastatin (LIPITOR) 20 MG tablet, , Disp: , Rfl:     colestipol (COLESTID) 1 g tablet, , Disp: , Rfl:     donepezil (ARICEPT) 10 MG tablet, , Disp: , Rfl:     ketoconazole (NIZORAL) 2 % shampoo, , Disp: , Rfl:     levothyroxine (SYNTHROID, LEVOTHROID) 75 MCG tablet, , Disp: , Rfl:     lisinopril (PRINIVIL,ZESTRIL) 5  "MG tablet, , Disp: , Rfl:     lisinopril-hydrochlorothiazide (PRINZIDE,ZESTORETIC) 10-12.5 MG per tablet, Take 1 tablet by mouth Daily., Disp: , Rfl:     loperamide (IMODIUM) 2 MG capsule, Take 1 capsule by mouth 4 (Four) Times a Day As Needed for Diarrhea., Disp: , Rfl:     omeprazole (priLOSEC) 40 MG capsule, Take 1 capsule by mouth Daily., Disp: , Rfl:     PARoxetine (PAXIL) 20 MG tablet, , Disp: , Rfl:     traMADol (ULTRAM) 50 MG tablet, Take 1 tablet by mouth., Disp: , Rfl:     traZODone (DESYREL) 50 MG tablet, , Disp: , Rfl:     Allergies   Allergen Reactions    Sulfa Antibiotics Other (See Comments)    Sulfamethoxazole-Trimethoprim Rash and Unknown - Low Severity        History reviewed. No pertinent family history.    Social History     Socioeconomic History    Marital status:    Tobacco Use    Smoking status: Former     Passive exposure: Past    Smokeless tobacco: Former    Tobacco comments:     Quit 25 years ago   Vaping Use    Vaping status: Never Used   Substance and Sexual Activity    Alcohol use: Defer    Drug use: Defer    Sexual activity: Defer       Vital Signs:   Resp 16   Ht 175.3 cm (69\")   Wt 102 kg (224 lb 13.9 oz)   BMI 33.21 kg/m²      Physical Exam     Result Review :   The following data was reviewed by: DELILAH Watts on 06/25/2025:  Results for orders placed or performed in visit on 06/25/25   Bladder Scan    Collection Time: 06/25/25  9:57 AM   Result Value Ref Range    Urine Volume 87    POC Urinalysis Dipstick, Automated    Collection Time: 06/25/25 10:09 AM    Specimen: Urine   Result Value Ref Range    Color Yellow Yellow, Straw, Dark Yellow, Ayesha    Clarity, UA Clear Clear    Specific Gravity  1.020 1.005 - 1.030    pH, Urine 5.5 5.0 - 8.0    Leukocytes Negative Negative    Nitrite, UA Negative Negative    Protein, POC Negative Negative mg/dL    Glucose, UA Negative Negative mg/dL    Ketones, UA Negative Negative    Urobilinogen, UA 0.2 E.U./dL Normal, 0.2 E.U./dL "    Bilirubin Negative Negative    Blood, UA Trace (A) Negative    Lot Number [id number]     Expiration Date 4/2,026           Bladder Scan interpretation 06/25/2025    Estimation of residual urine via BVI 3000 [facility] Bladder Scan  MA/nurse performing: digna barros Ma   Residual Urine: 87 ml  Indication: Personal history of malignant neoplasm of bladder    BPH with urinary obstruction    Benign prostatic hyperplasia without lower urinary tract symptoms   Position: Supine  Examination: Incremental scanning of the suprapubic area using 2.0 MHz transducer using copious amounts of acoustic gel.   Findings: An anechoic area was demonstrated which represented the bladder, with measurement of residual urine as noted. I inspected this myself. In that the residual urine was stable or insignificant, refer to plan for treatment and plan necessary at this time.             Procedures        Assessment and Plan    Diagnoses and all orders for this visit:    1. Personal history of malignant neoplasm of bladder (Primary)  -     Urinalysis, Microscopic Only - Urine, Clean Catch; Future    2. BPH with urinary obstruction  -     POC Urinalysis Dipstick, Automated  -     Bladder Scan  -     finasteride (PROSCAR) 5 MG tablet; Take 1 tablet by mouth Daily.  Dispense: 90 tablet; Refill: 4    3. Benign prostatic hyperplasia without lower urinary tract symptoms  -     doxazosin (CARDURA) 4 MG tablet; Take 1 tablet by mouth Every Night.  Dispense: 90 tablet; Refill: 4      Patient doing well at this point in time.     We will send ua for micro today.     If with >3-5 rbc will prompt workup with ct cysto.    Prescription refills sent for 1 year.    Patient will call the office if he is with any gross hematuria new or worsening symptoms.      I spent 10 minutes caring for Ty on this date of service. This time includes time spent by me in the following activities:reviewing tests, obtaining and/or reviewing a separately obtained history,  performing a medically appropriate examination and/or evaluation , counseling and educating the patient/family/caregiver, ordering medications, tests, or procedures, and documenting information in the medical record  Follow Up   Return in about 1 year (around 6/25/2026) for annual f./u bladder cancer / bph .  Patient was given instructions and counseling regarding his condition or for health maintenance advice. Please see specific information pulled into the AVS if appropriate.         This document has been electronically signed by DELILAH Watts  June 25, 2025 10:21 EDT

## 2025-06-27 ENCOUNTER — TELEPHONE (OUTPATIENT)
Dept: UROLOGY | Age: 85
End: 2025-06-27
Payer: MEDICARE

## 2025-06-27 NOTE — TELEPHONE ENCOUNTER
[facility] called and told me they had lost this patients urine specimen on Wednesday and had just found it. I asked if they could still run it and they said it was too old. I LM with the patient to let him know that they lab had lost his sample and he would need to do another one. I can fax over this to a lab if there is one closer to him. I wanted to apologize as well for the delay in his care. HUB okay to relay.

## 2025-06-30 ENCOUNTER — LAB (OUTPATIENT)
Dept: LAB | Facility: HOSPITAL | Age: 85
End: 2025-06-30
Payer: MEDICARE

## 2025-06-30 DIAGNOSIS — Z85.51 PERSONAL HISTORY OF MALIGNANT NEOPLASM OF BLADDER: ICD-10-CM

## 2025-06-30 LAB
BACTERIA UR QL AUTO: NORMAL /HPF
RBC # UR STRIP: NORMAL /HPF
REF LAB TEST METHOD: NORMAL
SQUAMOUS #/AREA URNS HPF: NORMAL /HPF
WBC # UR STRIP: NORMAL /HPF

## 2025-06-30 PROCEDURE — 81015 MICROSCOPIC EXAM OF URINE: CPT
